# Patient Record
Sex: FEMALE | Race: WHITE | NOT HISPANIC OR LATINO
[De-identification: names, ages, dates, MRNs, and addresses within clinical notes are randomized per-mention and may not be internally consistent; named-entity substitution may affect disease eponyms.]

---

## 2017-04-04 ENCOUNTER — APPOINTMENT (OUTPATIENT)
Dept: OBGYN | Facility: CLINIC | Age: 52
End: 2017-04-04

## 2017-04-11 ENCOUNTER — TRANSCRIPTION ENCOUNTER (OUTPATIENT)
Age: 52
End: 2017-04-11

## 2017-04-15 ENCOUNTER — APPOINTMENT (OUTPATIENT)
Dept: ULTRASOUND IMAGING | Facility: CLINIC | Age: 52
End: 2017-04-15

## 2017-04-15 ENCOUNTER — OUTPATIENT (OUTPATIENT)
Dept: OUTPATIENT SERVICES | Facility: HOSPITAL | Age: 52
LOS: 1 days | End: 2017-04-15
Payer: COMMERCIAL

## 2017-04-15 DIAGNOSIS — Z98.89 OTHER SPECIFIED POSTPROCEDURAL STATES: Chronic | ICD-10-CM

## 2017-04-15 DIAGNOSIS — Z00.8 ENCOUNTER FOR OTHER GENERAL EXAMINATION: ICD-10-CM

## 2017-04-15 DIAGNOSIS — Z90.721 ACQUIRED ABSENCE OF OVARIES, UNILATERAL: Chronic | ICD-10-CM

## 2017-04-15 PROCEDURE — 76856 US EXAM PELVIC COMPLETE: CPT

## 2017-04-15 PROCEDURE — 76830 TRANSVAGINAL US NON-OB: CPT

## 2017-04-18 ENCOUNTER — RESULT REVIEW (OUTPATIENT)
Age: 52
End: 2017-04-18

## 2017-04-19 ENCOUNTER — APPOINTMENT (OUTPATIENT)
Dept: OBGYN | Facility: CLINIC | Age: 52
End: 2017-04-19

## 2017-10-24 ENCOUNTER — APPOINTMENT (OUTPATIENT)
Dept: OBGYN | Facility: CLINIC | Age: 52
End: 2017-10-24

## 2017-10-25 ENCOUNTER — APPOINTMENT (OUTPATIENT)
Dept: OBGYN | Facility: CLINIC | Age: 52
End: 2017-10-25

## 2017-11-20 ENCOUNTER — APPOINTMENT (OUTPATIENT)
Dept: ULTRASOUND IMAGING | Facility: CLINIC | Age: 52
End: 2017-11-20
Payer: COMMERCIAL

## 2017-11-20 ENCOUNTER — OUTPATIENT (OUTPATIENT)
Dept: OUTPATIENT SERVICES | Facility: HOSPITAL | Age: 52
LOS: 1 days | End: 2017-11-20
Payer: COMMERCIAL

## 2017-11-20 DIAGNOSIS — Z90.721 ACQUIRED ABSENCE OF OVARIES, UNILATERAL: Chronic | ICD-10-CM

## 2017-11-20 DIAGNOSIS — Z98.89 OTHER SPECIFIED POSTPROCEDURAL STATES: Chronic | ICD-10-CM

## 2017-11-20 DIAGNOSIS — Z00.8 ENCOUNTER FOR OTHER GENERAL EXAMINATION: ICD-10-CM

## 2017-11-20 PROCEDURE — 76830 TRANSVAGINAL US NON-OB: CPT | Mod: 26

## 2017-11-20 PROCEDURE — 76856 US EXAM PELVIC COMPLETE: CPT

## 2017-11-20 PROCEDURE — 76830 TRANSVAGINAL US NON-OB: CPT

## 2017-11-20 PROCEDURE — 76856 US EXAM PELVIC COMPLETE: CPT | Mod: 26

## 2017-12-27 ENCOUNTER — APPOINTMENT (OUTPATIENT)
Dept: MRI IMAGING | Facility: CLINIC | Age: 52
End: 2017-12-27
Payer: COMMERCIAL

## 2017-12-27 ENCOUNTER — OUTPATIENT (OUTPATIENT)
Dept: OUTPATIENT SERVICES | Facility: HOSPITAL | Age: 52
LOS: 1 days | End: 2017-12-27
Payer: COMMERCIAL

## 2017-12-27 DIAGNOSIS — N83.201 UNSPECIFIED OVARIAN CYST, RIGHT SIDE: ICD-10-CM

## 2017-12-27 DIAGNOSIS — Z98.89 OTHER SPECIFIED POSTPROCEDURAL STATES: Chronic | ICD-10-CM

## 2017-12-27 DIAGNOSIS — Z90.721 ACQUIRED ABSENCE OF OVARIES, UNILATERAL: Chronic | ICD-10-CM

## 2017-12-27 PROCEDURE — A9585: CPT

## 2017-12-27 PROCEDURE — 72197 MRI PELVIS W/O & W/DYE: CPT | Mod: 26

## 2017-12-27 PROCEDURE — 72197 MRI PELVIS W/O & W/DYE: CPT

## 2017-12-27 PROCEDURE — 82565 ASSAY OF CREATININE: CPT

## 2018-02-07 ENCOUNTER — TRANSCRIPTION ENCOUNTER (OUTPATIENT)
Age: 53
End: 2018-02-07

## 2018-04-26 ENCOUNTER — TRANSCRIPTION ENCOUNTER (OUTPATIENT)
Age: 53
End: 2018-04-26

## 2018-10-08 ENCOUNTER — RESULT REVIEW (OUTPATIENT)
Age: 53
End: 2018-10-08

## 2018-10-09 ENCOUNTER — APPOINTMENT (OUTPATIENT)
Dept: OBGYN | Facility: CLINIC | Age: 53
End: 2018-10-09
Payer: COMMERCIAL

## 2018-10-09 PROCEDURE — 99213 OFFICE O/P EST LOW 20 MIN: CPT | Mod: 25

## 2018-10-09 PROCEDURE — 99396 PREV VISIT EST AGE 40-64: CPT

## 2018-10-31 ENCOUNTER — OUTPATIENT (OUTPATIENT)
Dept: OUTPATIENT SERVICES | Facility: HOSPITAL | Age: 53
LOS: 1 days | End: 2018-10-31
Payer: COMMERCIAL

## 2018-10-31 ENCOUNTER — APPOINTMENT (OUTPATIENT)
Dept: ULTRASOUND IMAGING | Facility: CLINIC | Age: 53
End: 2018-10-31
Payer: COMMERCIAL

## 2018-10-31 DIAGNOSIS — Z00.8 ENCOUNTER FOR OTHER GENERAL EXAMINATION: ICD-10-CM

## 2018-10-31 DIAGNOSIS — Z98.89 OTHER SPECIFIED POSTPROCEDURAL STATES: Chronic | ICD-10-CM

## 2018-10-31 DIAGNOSIS — Z90.721 ACQUIRED ABSENCE OF OVARIES, UNILATERAL: Chronic | ICD-10-CM

## 2018-10-31 PROCEDURE — 76830 TRANSVAGINAL US NON-OB: CPT

## 2018-10-31 PROCEDURE — 76830 TRANSVAGINAL US NON-OB: CPT | Mod: 26

## 2018-10-31 PROCEDURE — 76856 US EXAM PELVIC COMPLETE: CPT | Mod: 26

## 2018-10-31 PROCEDURE — 76856 US EXAM PELVIC COMPLETE: CPT

## 2019-01-01 ENCOUNTER — TRANSCRIPTION ENCOUNTER (OUTPATIENT)
Age: 54
End: 2019-01-01

## 2019-01-05 ENCOUNTER — OUTPATIENT (OUTPATIENT)
Dept: OUTPATIENT SERVICES | Facility: HOSPITAL | Age: 54
LOS: 1 days | End: 2019-01-05
Payer: COMMERCIAL

## 2019-01-05 ENCOUNTER — APPOINTMENT (OUTPATIENT)
Dept: ULTRASOUND IMAGING | Facility: CLINIC | Age: 54
End: 2019-01-05
Payer: COMMERCIAL

## 2019-01-05 DIAGNOSIS — Z98.89 OTHER SPECIFIED POSTPROCEDURAL STATES: Chronic | ICD-10-CM

## 2019-01-05 DIAGNOSIS — Z00.00 ENCOUNTER FOR GENERAL ADULT MEDICAL EXAMINATION WITHOUT ABNORMAL FINDINGS: ICD-10-CM

## 2019-01-05 DIAGNOSIS — Z90.721 ACQUIRED ABSENCE OF OVARIES, UNILATERAL: Chronic | ICD-10-CM

## 2019-01-05 PROCEDURE — 76830 TRANSVAGINAL US NON-OB: CPT | Mod: 26

## 2019-01-05 PROCEDURE — 76856 US EXAM PELVIC COMPLETE: CPT

## 2019-01-05 PROCEDURE — 76830 TRANSVAGINAL US NON-OB: CPT

## 2019-01-05 PROCEDURE — 76856 US EXAM PELVIC COMPLETE: CPT | Mod: 26

## 2019-01-27 ENCOUNTER — TRANSCRIPTION ENCOUNTER (OUTPATIENT)
Age: 54
End: 2019-01-27

## 2019-01-27 ENCOUNTER — EMERGENCY (EMERGENCY)
Facility: HOSPITAL | Age: 54
LOS: 1 days | Discharge: HOME | End: 2019-01-27
Attending: EMERGENCY MEDICINE
Payer: COMMERCIAL

## 2019-01-27 VITALS — WEIGHT: 115.96 LBS | HEIGHT: 60 IN

## 2019-01-27 VITALS
RESPIRATION RATE: 16 BRPM | SYSTOLIC BLOOD PRESSURE: 138 MMHG | OXYGEN SATURATION: 99 % | DIASTOLIC BLOOD PRESSURE: 68 MMHG | HEART RATE: 103 BPM

## 2019-01-27 DIAGNOSIS — Z98.89 OTHER SPECIFIED POSTPROCEDURAL STATES: Chronic | ICD-10-CM

## 2019-01-27 DIAGNOSIS — Z90.721 ACQUIRED ABSENCE OF OVARIES, UNILATERAL: Chronic | ICD-10-CM

## 2019-01-27 LAB
ALBUMIN SERPL ELPH-MCNC: 4.5 G/DL — SIGNIFICANT CHANGE UP (ref 3.3–5.2)
ALP SERPL-CCNC: 60 U/L — SIGNIFICANT CHANGE UP (ref 40–120)
ALT FLD-CCNC: 19 U/L — SIGNIFICANT CHANGE UP
ANION GAP SERPL CALC-SCNC: 13 MMOL/L — SIGNIFICANT CHANGE UP (ref 5–17)
APTT BLD: 31.4 SEC — SIGNIFICANT CHANGE UP (ref 27.5–36.3)
AST SERPL-CCNC: 18 U/L — SIGNIFICANT CHANGE UP
BILIRUB SERPL-MCNC: 1.6 MG/DL — SIGNIFICANT CHANGE UP (ref 0.4–2)
BUN SERPL-MCNC: 17 MG/DL — SIGNIFICANT CHANGE UP (ref 8–20)
CALCIUM SERPL-MCNC: 8.9 MG/DL — SIGNIFICANT CHANGE UP (ref 8.6–10.2)
CHLORIDE SERPL-SCNC: 102 MMOL/L — SIGNIFICANT CHANGE UP (ref 98–107)
CO2 SERPL-SCNC: 25 MMOL/L — SIGNIFICANT CHANGE UP (ref 22–29)
CREAT SERPL-MCNC: 0.47 MG/DL — LOW (ref 0.5–1.3)
D DIMER BLD IA.RAPID-MCNC: <150 NG/ML DDU — SIGNIFICANT CHANGE UP
GLUCOSE SERPL-MCNC: 100 MG/DL — SIGNIFICANT CHANGE UP (ref 70–115)
HCT VFR BLD CALC: 44.2 % — SIGNIFICANT CHANGE UP (ref 37–47)
HGB BLD-MCNC: 14.6 G/DL — SIGNIFICANT CHANGE UP (ref 12–16)
INR BLD: 0.96 RATIO — SIGNIFICANT CHANGE UP (ref 0.88–1.16)
MAGNESIUM SERPL-MCNC: 2.1 MG/DL — SIGNIFICANT CHANGE UP (ref 1.6–2.6)
MCHC RBC-ENTMCNC: 30.7 PG — SIGNIFICANT CHANGE UP (ref 27–31)
MCHC RBC-ENTMCNC: 33 G/DL — SIGNIFICANT CHANGE UP (ref 32–36)
MCV RBC AUTO: 93.1 FL — SIGNIFICANT CHANGE UP (ref 81–99)
NT-PROBNP SERPL-SCNC: 64 PG/ML — SIGNIFICANT CHANGE UP (ref 0–300)
PLATELET # BLD AUTO: 268 K/UL — SIGNIFICANT CHANGE UP (ref 150–400)
POTASSIUM SERPL-MCNC: 3.5 MMOL/L — SIGNIFICANT CHANGE UP (ref 3.5–5.3)
POTASSIUM SERPL-SCNC: 3.5 MMOL/L — SIGNIFICANT CHANGE UP (ref 3.5–5.3)
PROT SERPL-MCNC: 7.3 G/DL — SIGNIFICANT CHANGE UP (ref 6.6–8.7)
PROTHROM AB SERPL-ACNC: 11 SEC — SIGNIFICANT CHANGE UP (ref 10–12.9)
RBC # BLD: 4.75 M/UL — SIGNIFICANT CHANGE UP (ref 4.4–5.2)
RBC # FLD: 12.1 % — SIGNIFICANT CHANGE UP (ref 11–15.6)
SODIUM SERPL-SCNC: 140 MMOL/L — SIGNIFICANT CHANGE UP (ref 135–145)
TROPONIN T SERPL-MCNC: <0.01 NG/ML — SIGNIFICANT CHANGE UP (ref 0–0.06)
WBC # BLD: 6 K/UL — SIGNIFICANT CHANGE UP (ref 4.8–10.8)
WBC # FLD AUTO: 6 K/UL — SIGNIFICANT CHANGE UP (ref 4.8–10.8)

## 2019-01-27 PROCEDURE — 85730 THROMBOPLASTIN TIME PARTIAL: CPT

## 2019-01-27 PROCEDURE — 85610 PROTHROMBIN TIME: CPT

## 2019-01-27 PROCEDURE — 84484 ASSAY OF TROPONIN QUANT: CPT

## 2019-01-27 PROCEDURE — 80053 COMPREHEN METABOLIC PANEL: CPT

## 2019-01-27 PROCEDURE — 93306 TTE W/DOPPLER COMPLETE: CPT

## 2019-01-27 PROCEDURE — 85379 FIBRIN DEGRADATION QUANT: CPT

## 2019-01-27 PROCEDURE — 96374 THER/PROPH/DIAG INJ IV PUSH: CPT | Mod: XU

## 2019-01-27 PROCEDURE — 84702 CHORIONIC GONADOTROPIN TEST: CPT

## 2019-01-27 PROCEDURE — 83735 ASSAY OF MAGNESIUM: CPT

## 2019-01-27 PROCEDURE — 99284 EMERGENCY DEPT VISIT MOD MDM: CPT | Mod: 25

## 2019-01-27 PROCEDURE — 36415 COLL VENOUS BLD VENIPUNCTURE: CPT

## 2019-01-27 PROCEDURE — 93010 ELECTROCARDIOGRAM REPORT: CPT

## 2019-01-27 PROCEDURE — 93005 ELECTROCARDIOGRAM TRACING: CPT

## 2019-01-27 PROCEDURE — 94640 AIRWAY INHALATION TREATMENT: CPT

## 2019-01-27 PROCEDURE — 93306 TTE W/DOPPLER COMPLETE: CPT | Mod: 26

## 2019-01-27 PROCEDURE — 83880 ASSAY OF NATRIURETIC PEPTIDE: CPT

## 2019-01-27 PROCEDURE — 99285 EMERGENCY DEPT VISIT HI MDM: CPT

## 2019-01-27 PROCEDURE — 85027 COMPLETE CBC AUTOMATED: CPT

## 2019-01-27 PROCEDURE — 71046 X-RAY EXAM CHEST 2 VIEWS: CPT | Mod: 26

## 2019-01-27 PROCEDURE — 71046 X-RAY EXAM CHEST 2 VIEWS: CPT

## 2019-01-27 RX ORDER — IPRATROPIUM/ALBUTEROL SULFATE 18-103MCG
3 AEROSOL WITH ADAPTER (GRAM) INHALATION ONCE
Qty: 0 | Refills: 0 | Status: COMPLETED | OUTPATIENT
Start: 2019-01-27 | End: 2019-01-27

## 2019-01-27 RX ORDER — ALBUTEROL 90 UG/1
1 AEROSOL, METERED ORAL
Qty: 1 | Refills: 0 | OUTPATIENT
Start: 2019-01-27

## 2019-01-27 RX ORDER — AZITHROMYCIN 500 MG/1
1 TABLET, FILM COATED ORAL
Qty: 6 | Refills: 0 | OUTPATIENT
Start: 2019-01-27

## 2019-01-27 RX ORDER — FLUCONAZOLE 150 MG/1
1 TABLET ORAL
Qty: 1 | Refills: 0 | OUTPATIENT
Start: 2019-01-27 | End: 2019-01-27

## 2019-01-27 RX ADMIN — Medication 3 MILLILITER(S): at 12:43

## 2019-01-27 RX ADMIN — Medication 125 MILLIGRAM(S): at 12:43

## 2019-01-27 NOTE — ED ADULT TRIAGE NOTE - CHIEF COMPLAINT QUOTE
Pt ambulatory in ED c/o mid-sternal chest pain started last night. Pt states " I feel like I'm just not getting enough oxygen when I breath." Pt reports she went to urgent care, had breathing tx and did not improve.

## 2019-01-27 NOTE — ED PROVIDER NOTE - PROGRESS NOTE DETAILS
anup collins called about pt - knows her and will be in to see her as cardiac consultant per cards - ok for dc out pt f/u echo wnl

## 2019-01-27 NOTE — ED PROVIDER NOTE - OBJECTIVE STATEMENT
54 y/o F presents with SOB and feelings of chest heaviness  - persistent describes feeling a like a rock sitting on her chest, has hx of bronchitis which she has had this feeling with in the past and feels mostly likely what is going on but urgent care sent here here. no person or family hx of dvt or PE no prior stress testing non smoker slight cough no fevers no leg pain or swelling

## 2019-01-27 NOTE — ED ADULT NURSE NOTE - OBJECTIVE STATEMENT
pt comes from urgent care with reports of cough x few weeks intermittently, with pressure/heaviness to her chest that began last night. no fevers, given neb treatment and had EKG at urgent care aND TOLD TO  COME TO ed FOR CARDIAC WORKUP. AOX3, no resp distress, even and unlabored resps on exam, lungs CTAB.

## 2019-01-27 NOTE — CONSULT NOTE ADULT - SUBJECTIVE AND OBJECTIVE BOX
[place] [facility]                                   [address] [place] [address]                                                      PHONE: [phone]                                                         FAX: [phone]  [email or web address]  ---------------------------------------------------------------------------------------------------------------------------------    Reason for Consult: chest pain    HPI:  VASQUEZ COLLIER is an 53y Female with HTN h/o MVP in remote past by echo, fh cad, H/O recurrent URI with intermittent bronchospasm, had recent URI with cough came to ER after being seen in walk- in for chest pain and sent to ER.  ECg X2 no acute changes.   Sx have been since 10 pm last night, partially improved with steroids and nebulizer.    PAST MEDICAL & SURGICAL HISTORY:  Colitis  DM (diabetes mellitus) in pregnancy:   H/O renal calculi  MVP (mitral valve prolapse)  HTN (hypertension)  H/O laparoscopy: x 2  S/P oophorectomy: Left  H/O:   S/P appendectomy      No Known Allergies      MEDICATIONS  (STANDING):    MEDICATIONS  (PRN):      Social History:  Cigarettes:       neg             Alchohol:      neg           Illicit Drug Abuse:  neg  pos FH CAD    ROS: Negative other than as mentioned in HPI.    Vital Signs Last 24 Hrs  T(C): 36.6 (2019 11:40), Max: 36.6 (2019 11:40)  T(F): 97.9 (2019 11:40), Max: 97.9 (2019 11:40)  HR: 102 (2019 11:40) (102 - 102)  BP: 134/76 (2019 11:40) (134/76 - 134/76)  BP(mean): --  RR: 18 (2019 11:40) (18 - 18)  SpO2: 99% (2019 11:40) (99% - 99%)  ICU Vital Signs Last 24 Hrs  VASQUEZ COLLIER  I&O's Detail    I&O's Summary    Drug Dosing Weight  VASQUEZ COLLIER      PHYSICAL EXAM:  General: Appears well developed, well nourished alert and cooperative.  HEENT: Head; normocephalic, atraumatic.  Eyes: Pupils reactive, cornea wnl.  Neck: Supple, no nodes adenopathy, no NVD or carotid bruit or thyromegaly.  CARDIOVASCULAR: Normal S1 and S2, midsystolic click  LUNGS: No rales, rhonchi or wheeze. Normal breath sounds bilaterally.  ABDOMEN: Soft, nontender without mass or organomegaly. bowel sounds normoactive.  EXTREMITIES: No clubbing, cyanosis or edema. Distal pulses wnl.   SKIN: warm and dry with normal turgor.  NEURO: Alert/oriented x 3/normal motor exam. No pathologic reflexes.    PSYCH: normal affect.        LABS:                        14.6   6.0   )-----------( 268      ( 2019 12:01 )             44.2         140  |  102  |  17.0  ----------------------------<  100  3.5   |  25.0  |  0.47<L>    Ca    8.9      2019 12:01  Mg     2.1         TPro  7.3  /  Alb  4.5  /  TBili  1.6  /  DBili  x   /  AST  18  /  ALT  19  /  AlkPhos  60      VASQUEZ NANDO  CARDIAC MARKERS ( 2019 12:01 )  x     / <0.01 ng/mL / x     / x     / x          PT/INR - ( 2019 12:01 )   PT: 11.0 sec;   INR: 0.96 ratio         PTT - ( 2019 12:01 )  PTT:31.4 sec      RADIOLOGY & ADDITIONAL STUDIES: CXR Normal    INTERPRETATION OF TELEMETRY (personally reviewed):    ECG: NSR no acute changes

## 2019-01-27 NOTE — CONSULT NOTE ADULT - ASSESSMENT
Assessment  Atypical chest pain in setting of URI, no acute ECG changes, neg trop  h/o MVP  Htn controlled  URI    Rec  echo to exclude WMA and eber effusion  if echo normal may dc home with medrol dose pack/inhaler  outpt nuclear stress test Assessment  Atypical chest pain in setting of URI, no acute ECG changes, neg trop  h/o MVP  Htn controlled  URI    Rec  echo to exclude WMA and eber effusion  if echo normal may dc home with medrol dose pack/inhaler  outpt nuclear stress test    Addendum:  Echo shows normal LVEF 65% no wall motion abnl, no evidence of MVP,  no eber effusion, normal aorta and right heart  May dc home  Will FU in office

## 2019-01-27 NOTE — ED ADULT NURSE NOTE - PMH
Colitis    DM (diabetes mellitus) in pregnancy  2000  H/O renal calculi    HTN (hypertension)    MVP (mitral valve prolapse)

## 2019-02-11 ENCOUNTER — TRANSCRIPTION ENCOUNTER (OUTPATIENT)
Age: 54
End: 2019-02-11

## 2019-10-11 ENCOUNTER — RESULT REVIEW (OUTPATIENT)
Age: 54
End: 2019-10-11

## 2019-10-11 ENCOUNTER — APPOINTMENT (OUTPATIENT)
Dept: OBGYN | Facility: CLINIC | Age: 54
End: 2019-10-11
Payer: COMMERCIAL

## 2019-10-11 PROCEDURE — 99396 PREV VISIT EST AGE 40-64: CPT

## 2019-11-06 ENCOUNTER — APPOINTMENT (OUTPATIENT)
Dept: ULTRASOUND IMAGING | Facility: CLINIC | Age: 54
End: 2019-11-06
Payer: COMMERCIAL

## 2019-11-06 ENCOUNTER — OUTPATIENT (OUTPATIENT)
Dept: OUTPATIENT SERVICES | Facility: HOSPITAL | Age: 54
LOS: 1 days | End: 2019-11-06
Payer: COMMERCIAL

## 2019-11-06 DIAGNOSIS — Z98.89 OTHER SPECIFIED POSTPROCEDURAL STATES: Chronic | ICD-10-CM

## 2019-11-06 DIAGNOSIS — Z90.721 ACQUIRED ABSENCE OF OVARIES, UNILATERAL: Chronic | ICD-10-CM

## 2019-11-06 DIAGNOSIS — Z00.00 ENCOUNTER FOR GENERAL ADULT MEDICAL EXAMINATION WITHOUT ABNORMAL FINDINGS: ICD-10-CM

## 2019-11-06 PROCEDURE — 76830 TRANSVAGINAL US NON-OB: CPT | Mod: 26

## 2019-11-06 PROCEDURE — 76856 US EXAM PELVIC COMPLETE: CPT

## 2019-11-06 PROCEDURE — 76856 US EXAM PELVIC COMPLETE: CPT | Mod: 26

## 2019-11-06 PROCEDURE — 76830 TRANSVAGINAL US NON-OB: CPT

## 2020-01-07 ENCOUNTER — TRANSCRIPTION ENCOUNTER (OUTPATIENT)
Age: 55
End: 2020-01-07

## 2020-01-18 ENCOUNTER — TRANSCRIPTION ENCOUNTER (OUTPATIENT)
Age: 55
End: 2020-01-18

## 2020-01-24 ENCOUNTER — TRANSCRIPTION ENCOUNTER (OUTPATIENT)
Age: 55
End: 2020-01-24

## 2020-01-30 ENCOUNTER — RESULT CHARGE (OUTPATIENT)
Age: 55
End: 2020-01-30

## 2020-01-31 ENCOUNTER — APPOINTMENT (OUTPATIENT)
Dept: PULMONOLOGY | Facility: CLINIC | Age: 55
End: 2020-01-31
Payer: COMMERCIAL

## 2020-01-31 VITALS
WEIGHT: 130 LBS | HEART RATE: 83 BPM | HEIGHT: 60 IN | DIASTOLIC BLOOD PRESSURE: 74 MMHG | BODY MASS INDEX: 25.52 KG/M2 | OXYGEN SATURATION: 100 % | SYSTOLIC BLOOD PRESSURE: 136 MMHG

## 2020-01-31 DIAGNOSIS — Z80.7 FAMILY HISTORY OF OTHER MALIGNANT NEOPLASMS OF LYMPHOID, HEMATOPOIETIC AND RELATED TISSUES: ICD-10-CM

## 2020-01-31 DIAGNOSIS — Z86.79 PERSONAL HISTORY OF OTHER DISEASES OF THE CIRCULATORY SYSTEM: ICD-10-CM

## 2020-01-31 PROCEDURE — 99204 OFFICE O/P NEW MOD 45 MIN: CPT | Mod: 25

## 2020-01-31 PROCEDURE — 94664 DEMO&/EVAL PT USE INHALER: CPT | Mod: NC,59

## 2020-01-31 PROCEDURE — 94060 EVALUATION OF WHEEZING: CPT

## 2020-01-31 RX ORDER — PREDNISONE 20 MG/1
20 TABLET ORAL
Qty: 21 | Refills: 0 | Status: ACTIVE | COMMUNITY
Start: 2020-01-24

## 2020-01-31 RX ORDER — CIPROFLOXACIN AND DEXAMETHASONE 3; 1 MG/ML; MG/ML
0.3-0.1 SUSPENSION/ DROPS AURICULAR (OTIC)
Qty: 8 | Refills: 0 | Status: ACTIVE | COMMUNITY
Start: 2020-01-24

## 2020-01-31 RX ORDER — BROMPHENIRAMINE MALEATE, PSEUDOEPHEDRINE HYDROCHLORIDE, 2; 30; 10 MG/5ML; MG/5ML; MG/5ML
30-2-10 SYRUP ORAL
Qty: 240 | Refills: 0 | Status: ACTIVE | COMMUNITY
Start: 2020-01-30

## 2020-01-31 RX ORDER — METHYLPREDNISOLONE 4 MG/1
4 TABLET ORAL
Qty: 21 | Refills: 0 | Status: DISCONTINUED | COMMUNITY
Start: 2019-12-27

## 2020-01-31 RX ORDER — ACYCLOVIR AND HYDROCORTISONE 50; 10 MG/G; MG/G
5-1 CREAM TOPICAL
Qty: 5 | Refills: 0 | Status: DISCONTINUED | COMMUNITY
Start: 2019-12-04

## 2020-01-31 RX ORDER — VENLAFAXINE HYDROCHLORIDE 37.5 MG/1
37.5 CAPSULE, EXTENDED RELEASE ORAL
Qty: 90 | Refills: 0 | Status: ACTIVE | COMMUNITY
Start: 2019-10-11

## 2020-01-31 RX ORDER — CETIRIZINE HYDROCHLORIDE 10 MG/1
10 CAPSULE, LIQUID FILLED ORAL
Refills: 0 | Status: ACTIVE | COMMUNITY
Start: 2020-01-31

## 2020-01-31 RX ORDER — LOSARTAN POTASSIUM 50 MG/1
50 TABLET, FILM COATED ORAL
Qty: 90 | Refills: 0 | Status: ACTIVE | COMMUNITY
Start: 2019-07-24

## 2020-01-31 RX ORDER — ALBUTEROL SULFATE 90 UG/1
108 (90 BASE) INHALANT RESPIRATORY (INHALATION)
Qty: 9 | Refills: 0 | Status: ACTIVE | COMMUNITY
Start: 2019-12-27

## 2020-01-31 RX ORDER — FLUCONAZOLE 150 MG/1
150 TABLET ORAL
Qty: 1 | Refills: 0 | Status: ACTIVE | COMMUNITY
Start: 2019-12-04

## 2020-01-31 RX ORDER — PREDNISONE 50 MG/1
50 TABLET ORAL
Qty: 5 | Refills: 0 | Status: DISCONTINUED | COMMUNITY
Start: 2020-01-18

## 2020-01-31 RX ORDER — CEFUROXIME AXETIL 500 MG/1
500 TABLET ORAL
Qty: 10 | Refills: 1 | Status: ACTIVE | COMMUNITY
Start: 2020-01-31 | End: 1900-01-01

## 2020-01-31 NOTE — PHYSICAL EXAM
[No Acute Distress] : no acute distress [No Deformities] : no deformities [Normal Oropharynx] : normal oropharynx [Erythema] : erythema [II] : Mallampati Class: II [Normal Appearance] : normal appearance [No Neck Mass] : no neck mass [Normal Rate/Rhythm] : normal rate/rhythm [Normal S1, S2] : normal s1, s2 [No Murmurs] : no murmurs [No Resp Distress] : no resp distress [No Acc Muscle Use] : no acc muscle use [Clear to Auscultation Bilaterally] : clear to auscultation bilaterally [Normal to Percussion] : normal to percussion [No Abnormalities] : no abnormalities [Benign] : benign [Normal Gait] : normal gait [No Clubbing] : no clubbing [No Edema] : no edema [FROM] : FROM [Normal Color/ Pigmentation] : normal color/ pigmentation [Oriented x3] : oriented x3 [Normal Affect] : normal affect

## 2020-01-31 NOTE — REVIEW OF SYSTEMS
[Nasal Congestion] : nasal congestion [Negative] : Psychiatric [TextBox_30] : see HPI [TextBox_44] : see HPI

## 2020-01-31 NOTE — DISCUSSION/SUMMARY
[FreeTextEntry1] : Upper airway cough syndrome\par Chronic rhinosinusitis\par Lung exam is normal, normal pulse oximetry\par Spirometry is normal\par We'll obtain noncontrast CT of sinus\par Short course of antibiotics with probiotics for purulent sputum and nasal discharge\par Baseline chest x-ray\par P.r.n. rescue\par ENT followup\par 3 months or sooner if needed

## 2020-01-31 NOTE — CONSULT LETTER
[Dear  ___] : Dear  [unfilled], [Consult Letter:] : I had the pleasure of evaluating your patient, [unfilled]. [Please see my note below.] : Please see my note below. [Sincerely,] : Sincerely, [FreeTextEntry3] : Vipul Brice DO [facility]\par Pulmonary Critical Care\par Director Pulmonary Division\par Medical Director Respiratory Therapy\par [facility]\par \par  [DrAjit  ___] : Dr. PRUETT

## 2020-01-31 NOTE — HISTORY OF PRESENT ILLNESS
[TextBox_4] : last yr was in H, nebs, abx, had chest pain\par echo normal, cxr negative\par than slowly baseline, till this year \par followed by ENT- has myringotomy\par multiple pred use, saw PMD\par saw ENT, again given prednisone, no abx\par now with cough and sputum, no fevers, no CP currently\par has prn rescue\par saw recently

## 2020-02-02 ENCOUNTER — FORM ENCOUNTER (OUTPATIENT)
Age: 55
End: 2020-02-02

## 2020-02-03 ENCOUNTER — APPOINTMENT (OUTPATIENT)
Dept: RADIOLOGY | Facility: CLINIC | Age: 55
End: 2020-02-03
Payer: COMMERCIAL

## 2020-02-03 ENCOUNTER — FORM ENCOUNTER (OUTPATIENT)
Age: 55
End: 2020-02-03

## 2020-02-03 ENCOUNTER — OUTPATIENT (OUTPATIENT)
Dept: OUTPATIENT SERVICES | Facility: HOSPITAL | Age: 55
LOS: 1 days | End: 2020-02-03
Payer: COMMERCIAL

## 2020-02-03 DIAGNOSIS — Z90.721 ACQUIRED ABSENCE OF OVARIES, UNILATERAL: Chronic | ICD-10-CM

## 2020-02-03 DIAGNOSIS — Z98.89 OTHER SPECIFIED POSTPROCEDURAL STATES: Chronic | ICD-10-CM

## 2020-02-03 DIAGNOSIS — R05 COUGH: ICD-10-CM

## 2020-02-03 PROCEDURE — 71046 X-RAY EXAM CHEST 2 VIEWS: CPT | Mod: 26

## 2020-02-03 PROCEDURE — 71046 X-RAY EXAM CHEST 2 VIEWS: CPT

## 2020-02-04 ENCOUNTER — OUTPATIENT (OUTPATIENT)
Dept: OUTPATIENT SERVICES | Facility: HOSPITAL | Age: 55
LOS: 1 days | End: 2020-02-04
Payer: COMMERCIAL

## 2020-02-04 ENCOUNTER — APPOINTMENT (OUTPATIENT)
Dept: CT IMAGING | Facility: CLINIC | Age: 55
End: 2020-02-04
Payer: COMMERCIAL

## 2020-02-04 DIAGNOSIS — Z98.89 OTHER SPECIFIED POSTPROCEDURAL STATES: Chronic | ICD-10-CM

## 2020-02-04 DIAGNOSIS — R05 COUGH: ICD-10-CM

## 2020-02-04 DIAGNOSIS — J32.9 CHRONIC SINUSITIS, UNSPECIFIED: ICD-10-CM

## 2020-02-04 DIAGNOSIS — Z90.721 ACQUIRED ABSENCE OF OVARIES, UNILATERAL: Chronic | ICD-10-CM

## 2020-02-04 PROCEDURE — 70486 CT MAXILLOFACIAL W/O DYE: CPT | Mod: 26

## 2020-02-04 PROCEDURE — 70486 CT MAXILLOFACIAL W/O DYE: CPT

## 2020-02-05 ENCOUNTER — TRANSCRIPTION ENCOUNTER (OUTPATIENT)
Age: 55
End: 2020-02-05

## 2020-05-08 ENCOUNTER — APPOINTMENT (OUTPATIENT)
Dept: PULMONOLOGY | Facility: CLINIC | Age: 55
End: 2020-05-08

## 2020-05-22 ENCOUNTER — APPOINTMENT (OUTPATIENT)
Dept: OBGYN | Facility: CLINIC | Age: 55
End: 2020-05-22
Payer: COMMERCIAL

## 2020-05-22 ENCOUNTER — APPOINTMENT (OUTPATIENT)
Dept: OBGYN | Facility: CLINIC | Age: 55
End: 2020-05-22

## 2020-05-22 PROCEDURE — 99213 OFFICE O/P EST LOW 20 MIN: CPT

## 2021-03-12 ENCOUNTER — APPOINTMENT (OUTPATIENT)
Dept: ULTRASOUND IMAGING | Facility: CLINIC | Age: 56
End: 2021-03-12
Payer: COMMERCIAL

## 2021-03-12 ENCOUNTER — OUTPATIENT (OUTPATIENT)
Dept: OUTPATIENT SERVICES | Facility: HOSPITAL | Age: 56
LOS: 1 days | End: 2021-03-12
Payer: COMMERCIAL

## 2021-03-12 DIAGNOSIS — Z98.89 OTHER SPECIFIED POSTPROCEDURAL STATES: Chronic | ICD-10-CM

## 2021-03-12 DIAGNOSIS — Z90.721 ACQUIRED ABSENCE OF OVARIES, UNILATERAL: Chronic | ICD-10-CM

## 2021-03-12 DIAGNOSIS — Z00.8 ENCOUNTER FOR OTHER GENERAL EXAMINATION: ICD-10-CM

## 2021-03-12 PROCEDURE — 76700 US EXAM ABDOM COMPLETE: CPT | Mod: 26

## 2021-03-12 PROCEDURE — 76700 US EXAM ABDOM COMPLETE: CPT

## 2021-03-30 ENCOUNTER — OUTPATIENT (OUTPATIENT)
Dept: OUTPATIENT SERVICES | Facility: HOSPITAL | Age: 56
LOS: 1 days | End: 2021-03-30
Payer: COMMERCIAL

## 2021-03-30 ENCOUNTER — APPOINTMENT (OUTPATIENT)
Dept: RADIOLOGY | Facility: CLINIC | Age: 56
End: 2021-03-30

## 2021-03-30 ENCOUNTER — APPOINTMENT (OUTPATIENT)
Dept: CT IMAGING | Facility: CLINIC | Age: 56
End: 2021-03-30
Payer: COMMERCIAL

## 2021-03-30 DIAGNOSIS — Z98.89 OTHER SPECIFIED POSTPROCEDURAL STATES: Chronic | ICD-10-CM

## 2021-03-30 DIAGNOSIS — Z90.721 ACQUIRED ABSENCE OF OVARIES, UNILATERAL: Chronic | ICD-10-CM

## 2021-03-30 DIAGNOSIS — G47.00 INSOMNIA, UNSPECIFIED: ICD-10-CM

## 2021-03-30 PROCEDURE — 71046 X-RAY EXAM CHEST 2 VIEWS: CPT

## 2021-03-30 PROCEDURE — 74150 CT ABDOMEN W/O CONTRAST: CPT | Mod: 26

## 2021-03-30 PROCEDURE — 71046 X-RAY EXAM CHEST 2 VIEWS: CPT | Mod: 26

## 2021-03-30 PROCEDURE — 74150 CT ABDOMEN W/O CONTRAST: CPT

## 2021-04-26 ENCOUNTER — RESULT REVIEW (OUTPATIENT)
Age: 56
End: 2021-04-26

## 2021-04-26 ENCOUNTER — APPOINTMENT (OUTPATIENT)
Dept: ULTRASOUND IMAGING | Facility: CLINIC | Age: 56
End: 2021-04-26
Payer: COMMERCIAL

## 2021-04-26 ENCOUNTER — OUTPATIENT (OUTPATIENT)
Dept: OUTPATIENT SERVICES | Facility: HOSPITAL | Age: 56
LOS: 1 days | End: 2021-04-26
Payer: COMMERCIAL

## 2021-04-26 DIAGNOSIS — N80.9 ENDOMETRIOSIS, UNSPECIFIED: ICD-10-CM

## 2021-04-26 DIAGNOSIS — Z00.8 ENCOUNTER FOR OTHER GENERAL EXAMINATION: ICD-10-CM

## 2021-04-26 DIAGNOSIS — Z98.89 OTHER SPECIFIED POSTPROCEDURAL STATES: Chronic | ICD-10-CM

## 2021-04-26 DIAGNOSIS — Z90.721 ACQUIRED ABSENCE OF OVARIES, UNILATERAL: Chronic | ICD-10-CM

## 2021-04-26 PROCEDURE — 76830 TRANSVAGINAL US NON-OB: CPT | Mod: 26

## 2021-04-26 PROCEDURE — 76856 US EXAM PELVIC COMPLETE: CPT

## 2021-04-26 PROCEDURE — 76830 TRANSVAGINAL US NON-OB: CPT

## 2021-04-26 PROCEDURE — 76856 US EXAM PELVIC COMPLETE: CPT | Mod: 26

## 2021-05-16 ENCOUNTER — RESULT REVIEW (OUTPATIENT)
Age: 56
End: 2021-05-16

## 2021-05-17 ENCOUNTER — APPOINTMENT (OUTPATIENT)
Dept: OBGYN | Facility: CLINIC | Age: 56
End: 2021-05-17
Payer: COMMERCIAL

## 2021-05-17 PROCEDURE — 99396 PREV VISIT EST AGE 40-64: CPT

## 2021-05-17 PROCEDURE — 99072 ADDL SUPL MATRL&STAF TM PHE: CPT

## 2021-06-25 ENCOUNTER — OUTPATIENT (OUTPATIENT)
Dept: OUTPATIENT SERVICES | Facility: HOSPITAL | Age: 56
LOS: 1 days | End: 2021-06-25

## 2021-06-25 ENCOUNTER — APPOINTMENT (OUTPATIENT)
Dept: ULTRASOUND IMAGING | Facility: CLINIC | Age: 56
End: 2021-06-25
Payer: COMMERCIAL

## 2021-06-25 DIAGNOSIS — Z00.8 ENCOUNTER FOR OTHER GENERAL EXAMINATION: ICD-10-CM

## 2021-06-25 DIAGNOSIS — Z90.721 ACQUIRED ABSENCE OF OVARIES, UNILATERAL: Chronic | ICD-10-CM

## 2021-06-25 DIAGNOSIS — Z98.89 OTHER SPECIFIED POSTPROCEDURAL STATES: Chronic | ICD-10-CM

## 2021-06-25 DIAGNOSIS — Z00.00 ENCOUNTER FOR GENERAL ADULT MEDICAL EXAMINATION WITHOUT ABNORMAL FINDINGS: ICD-10-CM

## 2021-06-25 PROCEDURE — 76536 US EXAM OF HEAD AND NECK: CPT | Mod: 26

## 2021-06-28 ENCOUNTER — APPOINTMENT (OUTPATIENT)
Dept: NEUROLOGY | Facility: CLINIC | Age: 56
End: 2021-06-28
Payer: COMMERCIAL

## 2021-06-28 DIAGNOSIS — L29.9 PRURITUS, UNSPECIFIED: ICD-10-CM

## 2021-06-28 PROCEDURE — 99204 OFFICE O/P NEW MOD 45 MIN: CPT

## 2021-06-28 PROCEDURE — 99072 ADDL SUPL MATRL&STAF TM PHE: CPT

## 2021-06-28 RX ORDER — GABAPENTIN 300 MG/1
300 CAPSULE ORAL 3 TIMES DAILY
Qty: 90 | Refills: 3 | Status: ACTIVE | COMMUNITY
Start: 2021-06-28 | End: 1900-01-01

## 2021-06-28 RX ORDER — PREDNISONE 5 MG/1
5 TABLET ORAL
Qty: 39 | Refills: 0 | Status: DISCONTINUED | COMMUNITY
Start: 2020-01-23 | End: 2021-06-28

## 2021-06-28 NOTE — ASSESSMENT
[FreeTextEntry1] : This is a 55-year-old woman with generalized pruritus. She does have some worsening of pruritus in the posterior the right shoulder area. While is rare to have itching is a neurologic problem the differential diagnosis would include stroke, small fiber neuropathy, multiple sclerosis, stroke, brachioradial pruritus or notalgia paresthetica. Like to give her a trial of gabapentin to see if this may help. I will have her titrate gabapentin from 300 mg daily to 300 mg 3 times a day over the course of one month. Also check an MRI of her brain and cervical spine. These are all normal she may need a skin biopsy to look for small fiber neuropathy. I will call her with results of the MRIs and see her back in the office in 2 months, sooner should the need arise.

## 2021-06-28 NOTE — PHYSICAL EXAM
[General Appearance - Alert] : alert [General Appearance - In No Acute Distress] : in no acute distress [General Appearance - Well Nourished] : well nourished [General Appearance - Well Developed] : well developed [Person] : oriented to person [Place] : oriented to place [Time] : oriented to time [Remote Intact] : remote memory intact [Registration Intact] : recent registration memory intact [Span Intact] : the attention span was normal [Concentration Intact] : normal concentrating ability [Visual Intact] : visual attention was ~T not ~L decreased [Naming Objects] : no difficulty naming common objects [Repeating Phrases] : no difficulty repeating a phrase [Fluency] : fluency intact [Comprehension] : comprehension intact [Current Events] : adequate knowledge of current events [Past History] : adequate knowledge of personal past history [Cranial Nerves Optic (II)] : visual acuity intact bilaterally,  visual fields full to confrontation, pupils equal round and reactive to light [Cranial Nerves Oculomotor (III)] : extraocular motion intact [Cranial Nerves Trigeminal (V)] : facial sensation intact symmetrically [Cranial Nerves Facial (VII)] : face symmetrical [Cranial Nerves Vestibulocochlear (VIII)] : hearing was intact bilaterally [Cranial Nerves Glossopharyngeal (IX)] : tongue and palate midline [Cranial Nerves Accessory (XI - Cranial And Spinal)] : head turning and shoulder shrug symmetric [Cranial Nerves Hypoglossal (XII)] : there was no tongue deviation with protrusion [Motor Tone] : muscle tone was normal in all four extremities [Motor Strength] : muscle strength was normal in all four extremities [Involuntary Movements] : no involuntary movements were seen [No Muscle Atrophy] : normal bulk in all four extremities [Paresis Pronator Drift Right-Sided] : no pronator drift on the right [Paresis Pronator Drift Left-Sided] : no pronator drift on the left [Motor Strength Upper Extremities Bilaterally] : strength was normal in both upper extremities [Motor Strength Lower Extremities Bilaterally] : strength was normal in both lower extremities [Sensation Tactile Decrease] : light touch was intact [Sensation Pain / Temperature Decrease] : pain and temperature was intact [Sensation Vibration Decrease] : vibration was intact [Proprioception] : proprioception was intact [Abnormal Walk] : normal gait [Balance] : balance was intact [Past-pointing] : there was no past-pointing [Tremor] : no tremor present [2+] : Patella left 2+ [Sclera] : the sclera and conjunctiva were normal [PERRL With Normal Accommodation] : pupils were equal in size, round, reactive to light, with normal accommodation [Extraocular Movements] : extraocular movements were intact [No APD] : no afferent pupillary defect [No CHYNA] : no internuclear ophthalmoplegia [Full Visual Field] : full visual field

## 2021-06-28 NOTE — CONSULT LETTER
[Dear  ___] : Dear  [unfilled], [Consult Letter:] : I had the pleasure of evaluating your patient, [unfilled]. [Please see my note below.] : Please see my note below. [Consult Closing:] : Thank you very much for allowing me to participate in the care of this patient.  If you have any questions, please do not hesitate to contact me. [Sincerely,] : Sincerely, [FreeTextEntry3] : Pepe Cox M.D., Ph.D. DPN-N\par [facility]\par Neurology at [place]\par Medical Director of Stroke Services\par [facility]\par

## 2021-06-28 NOTE — DATA REVIEWED
[de-identified] : I reviewed extensive amounts of blood work none of which were entirely or [place] of [place].

## 2021-06-28 NOTE — HISTORY OF PRESENT ILLNESS
[FreeTextEntry1] : Initial office visit June 28, 2021:\par This is a 55-year-old woman who presents today for evaluation of pruritus. She has had a generalized itching since February. It's now I still generalized but a bit more localized at times to her right shoulder. She has had extensive workup for seeing multiple specialties including hematology oncology, allergy immunology, endocrinology, and rheumatology for this without any clear diagnosis. She was given a presumptive diagnosis of fibromyalgia for aches and pains in her legs predominantly and was given Cymbalta which was not helping so she is no longer taking it. She takes losartan for hypertension has been on this for some time. Extensive blood work was done and is mostly unrevealing. She is here today for neurologic evaluation to see if this may be a neurologic cause pruritus.

## 2021-07-01 ENCOUNTER — TRANSCRIPTION ENCOUNTER (OUTPATIENT)
Age: 56
End: 2021-07-01

## 2021-07-07 ENCOUNTER — TRANSCRIPTION ENCOUNTER (OUTPATIENT)
Age: 56
End: 2021-07-07

## 2021-07-07 ENCOUNTER — APPOINTMENT (OUTPATIENT)
Dept: MRI IMAGING | Facility: CLINIC | Age: 56
End: 2021-07-07
Payer: COMMERCIAL

## 2021-07-07 ENCOUNTER — OUTPATIENT (OUTPATIENT)
Dept: OUTPATIENT SERVICES | Facility: HOSPITAL | Age: 56
LOS: 1 days | End: 2021-07-07
Payer: COMMERCIAL

## 2021-07-07 DIAGNOSIS — Z98.89 OTHER SPECIFIED POSTPROCEDURAL STATES: Chronic | ICD-10-CM

## 2021-07-07 DIAGNOSIS — Z00.8 ENCOUNTER FOR OTHER GENERAL EXAMINATION: ICD-10-CM

## 2021-07-07 DIAGNOSIS — Z90.721 ACQUIRED ABSENCE OF OVARIES, UNILATERAL: Chronic | ICD-10-CM

## 2021-07-07 DIAGNOSIS — L29.9 PRURITUS, UNSPECIFIED: ICD-10-CM

## 2021-07-07 PROCEDURE — 72141 MRI NECK SPINE W/O DYE: CPT | Mod: 26

## 2021-07-07 PROCEDURE — 70551 MRI BRAIN STEM W/O DYE: CPT

## 2021-07-07 PROCEDURE — 72141 MRI NECK SPINE W/O DYE: CPT

## 2021-07-07 PROCEDURE — 70551 MRI BRAIN STEM W/O DYE: CPT | Mod: 26

## 2021-08-23 ENCOUNTER — RESULT REVIEW (OUTPATIENT)
Age: 56
End: 2021-08-23

## 2021-08-23 ENCOUNTER — APPOINTMENT (OUTPATIENT)
Dept: ULTRASOUND IMAGING | Facility: CLINIC | Age: 56
End: 2021-08-23
Payer: COMMERCIAL

## 2021-08-23 ENCOUNTER — APPOINTMENT (OUTPATIENT)
Dept: MAMMOGRAPHY | Facility: CLINIC | Age: 56
End: 2021-08-23
Payer: COMMERCIAL

## 2021-08-23 PROCEDURE — 77067 SCR MAMMO BI INCL CAD: CPT

## 2021-08-23 PROCEDURE — 77063 BREAST TOMOSYNTHESIS BI: CPT

## 2021-08-23 PROCEDURE — 76641 ULTRASOUND BREAST COMPLETE: CPT | Mod: 50

## 2021-08-30 ENCOUNTER — APPOINTMENT (OUTPATIENT)
Dept: NEUROLOGY | Facility: CLINIC | Age: 56
End: 2021-08-30

## 2021-10-13 ENCOUNTER — TRANSCRIPTION ENCOUNTER (OUTPATIENT)
Age: 56
End: 2021-10-13

## 2021-11-29 ENCOUNTER — TRANSCRIPTION ENCOUNTER (OUTPATIENT)
Age: 56
End: 2021-11-29

## 2022-02-18 ENCOUNTER — TRANSCRIPTION ENCOUNTER (OUTPATIENT)
Age: 57
End: 2022-02-18

## 2022-03-18 ENCOUNTER — TRANSCRIPTION ENCOUNTER (OUTPATIENT)
Age: 57
End: 2022-03-18

## 2022-03-29 ENCOUNTER — TRANSCRIPTION ENCOUNTER (OUTPATIENT)
Age: 57
End: 2022-03-29

## 2022-11-21 ENCOUNTER — OFFICE (OUTPATIENT)
Dept: URBAN - METROPOLITAN AREA CLINIC 115 | Facility: CLINIC | Age: 57
Setting detail: OPHTHALMOLOGY
End: 2022-11-21
Payer: COMMERCIAL

## 2022-11-21 DIAGNOSIS — H43.811: ICD-10-CM

## 2022-11-21 PROCEDURE — 92201 OPSCPY EXTND RTA DRAW UNI/BI: CPT | Performed by: OPHTHALMOLOGY

## 2022-11-21 PROCEDURE — 92014 COMPRE OPH EXAM EST PT 1/>: CPT | Performed by: OPHTHALMOLOGY

## 2022-11-21 ASSESSMENT — REFRACTION_MANIFEST
OD_SPHERE: -0.75
OS_SPHERE: -1.00
OD_CYLINDER: -1.00
OD_SPHERE: -0.75
OS_AXIS: 177
OS_SPHERE: -1.00
OS_CYLINDER: -1.75
OD_CYLINDER: -1.50
OD_AXIS: 176
OS_VA1: 20/20
OS_CYLINDER: -1.75
OD_VA1: 20/20
OS_AXIS: 175
OD_AXIS: 180
OS_VA1: 20/20
OD_VA1: 20/20

## 2022-11-21 ASSESSMENT — REFRACTION_CURRENTRX
OS_VPRISM_DIRECTION: SV
OS_AXIS: 175
OD_SPHERE: -1.25
OS_SPHERE: -1.50
OD_AXIS: 180
OD_OVR_VA: 20/
OD_AXIS: 179
OD_VPRISM_DIRECTION: SV
OS_SPHERE: -1.50
OS_CYLINDER: -1.75
OD_CYLINDER: -1.75
OS_OVR_VA: 20/
OS_OVR_VA: 20/
OS_AXIS: 175
OD_OVR_VA: 20/
OD_CYLINDER: -1.75
OD_SPHERE: -1.25
OS_CYLINDER: -1.75

## 2022-11-21 ASSESSMENT — TEAR BREAK UP TIME (TBUT)
OS_TBUT: T 1+
OD_TBUT: T 1+

## 2022-11-21 ASSESSMENT — REFRACTION_AUTOREFRACTION
OS_SPHERE: -1.00
OD_AXIS: 177
OS_AXIS: 177
OD_SPHERE: -0.50
OD_CYLINDER: -2.00
OS_CYLINDER: -2.25

## 2022-11-21 ASSESSMENT — SPHEQUIV_DERIVED
OD_SPHEQUIV: -1.5
OD_SPHEQUIV: -1.5
OD_SPHEQUIV: -1.25
OS_SPHEQUIV: -1.875
OS_SPHEQUIV: -1.875
OS_SPHEQUIV: -2.125

## 2022-11-21 ASSESSMENT — CONFRONTATIONAL VISUAL FIELD TEST (CVF)
OD_FINDINGS: FULL
OS_FINDINGS: FULL

## 2022-11-21 ASSESSMENT — VISUAL ACUITY
OD_BCVA: 20/20-2
OS_BCVA: 20/25

## 2022-11-26 ENCOUNTER — NON-APPOINTMENT (OUTPATIENT)
Age: 57
End: 2022-11-26

## 2022-12-02 ENCOUNTER — NON-APPOINTMENT (OUTPATIENT)
Age: 57
End: 2022-12-02

## 2023-02-21 ENCOUNTER — NON-APPOINTMENT (OUTPATIENT)
Age: 58
End: 2023-02-21

## 2023-02-23 ENCOUNTER — APPOINTMENT (OUTPATIENT)
Dept: MAMMOGRAPHY | Facility: CLINIC | Age: 58
End: 2023-02-23
Payer: COMMERCIAL

## 2023-02-23 ENCOUNTER — RESULT REVIEW (OUTPATIENT)
Age: 58
End: 2023-02-23

## 2023-02-23 ENCOUNTER — NON-APPOINTMENT (OUTPATIENT)
Age: 58
End: 2023-02-23

## 2023-02-23 ENCOUNTER — APPOINTMENT (OUTPATIENT)
Dept: ULTRASOUND IMAGING | Facility: CLINIC | Age: 58
End: 2023-02-23
Payer: COMMERCIAL

## 2023-02-23 ENCOUNTER — APPOINTMENT (OUTPATIENT)
Dept: OBGYN | Facility: CLINIC | Age: 58
End: 2023-02-23
Payer: COMMERCIAL

## 2023-02-23 VITALS
DIASTOLIC BLOOD PRESSURE: 75 MMHG | BODY MASS INDEX: 29.45 KG/M2 | HEIGHT: 60 IN | RESPIRATION RATE: 14 BRPM | OXYGEN SATURATION: 100 % | SYSTOLIC BLOOD PRESSURE: 134 MMHG | HEART RATE: 90 BPM | WEIGHT: 150 LBS

## 2023-02-23 DIAGNOSIS — Z12.31 ENCOUNTER FOR SCREENING MAMMOGRAM FOR MALIGNANT NEOPLASM OF BREAST: ICD-10-CM

## 2023-02-23 DIAGNOSIS — Z01.419 ENCOUNTER FOR GYNECOLOGICAL EXAMINATION (GENERAL) (ROUTINE) W/OUT ABNORMAL FINDINGS: ICD-10-CM

## 2023-02-23 DIAGNOSIS — R92.2 INCONCLUSIVE MAMMOGRAM: ICD-10-CM

## 2023-02-23 DIAGNOSIS — N83.209 UNSPECIFIED OVARIAN CYST, UNSPECIFIED SIDE: ICD-10-CM

## 2023-02-23 PROCEDURE — 77067 SCR MAMMO BI INCL CAD: CPT

## 2023-02-23 PROCEDURE — 76641 ULTRASOUND BREAST COMPLETE: CPT | Mod: 50

## 2023-02-23 PROCEDURE — 99396 PREV VISIT EST AGE 40-64: CPT

## 2023-02-23 PROCEDURE — 77063 BREAST TOMOSYNTHESIS BI: CPT

## 2023-02-23 NOTE — PLAN
[FreeTextEntry1] : HCM\par -SBE\par -pap & HPV today (supracervical hysterectomy)\par -Rx mammo given\par -MVI, Calcium, Vit d\par -Weight/exercise\par -Discussed and reviewed importance of monthly BSE\par -f/u PCP for recommended HCM, vaccinations and CA screening\par \par Hx OC\par -Rx pelvic sono given\par \par RTO 1 year

## 2023-02-23 NOTE — PHYSICAL EXAM
[Chaperone Declined] : Patient declined chaperone [Appropriately responsive] : appropriately responsive [Alert] : alert [No Acute Distress] : no acute distress [No Lymphadenopathy] : no lymphadenopathy [Soft] : soft [Non-tender] : non-tender [Non-distended] : non-distended [No Lesions] : no lesions [No Mass] : no mass [Oriented x3] : oriented x3 [Examination Of The Breasts] : a normal appearance [No Masses] : no breast masses were palpable [Labia Majora] : normal [Labia Minora] : normal [Normal] : normal [Uterine Adnexae] : normal [FreeTextEntry4] : Color pink, no distress, [FreeTextEntry5] : Resp. rate wnl, color pink, no SOB [Absent] : absent

## 2023-02-28 LAB
CYTOLOGY CVX/VAG DOC THIN PREP: ABNORMAL
HPV HIGH+LOW RISK DNA PNL CVX: NOT DETECTED

## 2023-03-31 ENCOUNTER — NON-APPOINTMENT (OUTPATIENT)
Age: 58
End: 2023-03-31

## 2023-07-21 NOTE — HISTORY OF PRESENT ILLNESS
Remained stable, he is getting his routine lung scans she is also evaluating his aorta. [FreeTextEntry1] : 56yo  presents for routine gyn exam.  Pt. reports mutiple ailments that begane with the onset of menopause for which she has followed up with internist/neurologist/GI etc and was diagnosed with fibromyalgia.  No current gyn complaints. Hx of ovarian cyst, pt. never followed up. \par \par Info. from prior EMR:\par Demographics: Race: White Ethnicity: Not  or  Native Language: English\par : 3 Para: 1 2 1 3\par OB History:  X1- ,  (TWINS) , ECTOPIC  (h/o GDM- followed by PMD)\par GYN\par ENDOMETRIOSIS, ovarian cysts Sexually Active \par \par Type of Contraception: None\par PMH\par No significant past medical history. colitis\par Accepts blood products\par Surgical History: opscope x 2 for endometriosis, x-lap oophorectomy, C/S, opscope salpingectomy for ectopic, 9/21/15 LSH + RS\par Allergies: NKDA\par Current Medications: Prescribed/Suppliments/OTC Losartan Potassium 50mg tab, MOTRIN PRN, Effexor 37.5mg\par Medications Verified Medications Verified\par Last PAP: 10/11/2019 -- Pap/HPV Neg.  VD  10/17/18 Last Mammo: 2020 - mammo/sono wnl Last Dexa: 2019 - L femoral neck osteopenia pt advised Ca, D and weight bearing exercise dl17 Last Colonoscopy:  Last Breast Sono:: 2019\par Family Hx\par Heart Disease: father Colon Cancer: uncle\par \par Personal history of blood clots/DVT/PE: no\par Personal history of conditions causing increased risk of blood clots/DVT/PE: no\par Family history of blood clots/DVT/PE: no\par Family history of conditions causing increased risk of blood clots/DVT/PE: no\par \par Social History\par Patient nonsmoker and has no familial exposure to second hand smoke\par Smoker Status: Never smoker [PapSmeardate] : 5/2021 [TextBox_31] : wnl

## 2024-01-24 ENCOUNTER — NON-APPOINTMENT (OUTPATIENT)
Age: 59
End: 2024-01-24

## 2024-02-10 ENCOUNTER — NON-APPOINTMENT (OUTPATIENT)
Age: 59
End: 2024-02-10

## 2024-02-12 ENCOUNTER — NON-APPOINTMENT (OUTPATIENT)
Age: 59
End: 2024-02-12

## 2024-02-21 ENCOUNTER — APPOINTMENT (OUTPATIENT)
Dept: PULMONOLOGY | Facility: CLINIC | Age: 59
End: 2024-02-21
Payer: COMMERCIAL

## 2024-02-21 VITALS
WEIGHT: 145 LBS | DIASTOLIC BLOOD PRESSURE: 68 MMHG | BODY MASS INDEX: 28.47 KG/M2 | HEART RATE: 118 BPM | HEIGHT: 60 IN | OXYGEN SATURATION: 97 % | SYSTOLIC BLOOD PRESSURE: 101 MMHG

## 2024-02-21 DIAGNOSIS — R05.8 OTHER SPECIFIED COUGH: ICD-10-CM

## 2024-02-21 DIAGNOSIS — R05.9 COUGH, UNSPECIFIED: ICD-10-CM

## 2024-02-21 DIAGNOSIS — J32.9 CHRONIC SINUSITIS, UNSPECIFIED: ICD-10-CM

## 2024-02-21 PROCEDURE — 99203 OFFICE O/P NEW LOW 30 MIN: CPT | Mod: 25,GC

## 2024-02-21 PROCEDURE — 94729 DIFFUSING CAPACITY: CPT

## 2024-02-21 PROCEDURE — 94726 PLETHYSMOGRAPHY LUNG VOLUMES: CPT

## 2024-02-21 PROCEDURE — ZZZZZ: CPT

## 2024-02-21 PROCEDURE — 94010 BREATHING CAPACITY TEST: CPT

## 2024-04-08 ENCOUNTER — NON-APPOINTMENT (OUTPATIENT)
Age: 59
End: 2024-04-08

## 2024-04-27 ENCOUNTER — NON-APPOINTMENT (OUTPATIENT)
Age: 59
End: 2024-04-27

## 2024-05-13 ENCOUNTER — NON-APPOINTMENT (OUTPATIENT)
Age: 59
End: 2024-05-13